# Patient Record
Sex: FEMALE | Race: WHITE | Employment: FULL TIME | URBAN - METROPOLITAN AREA
[De-identification: names, ages, dates, MRNs, and addresses within clinical notes are randomized per-mention and may not be internally consistent; named-entity substitution may affect disease eponyms.]

---

## 2019-02-15 ENCOUNTER — HOSPITAL ENCOUNTER (EMERGENCY)
Facility: CLINIC | Age: 54
Discharge: HOME | End: 2019-02-16
Attending: EMERGENCY MEDICINE | Admitting: EMERGENCY MEDICINE
Payer: COMMERCIAL

## 2019-02-15 DIAGNOSIS — K52.9 COLITIS: ICD-10-CM

## 2019-02-15 DIAGNOSIS — R10.32 LLQ ABDOMINAL PAIN: ICD-10-CM

## 2019-02-15 DIAGNOSIS — K57.30 DIVERTICULAR DISEASE OF COLON: ICD-10-CM

## 2019-02-15 DIAGNOSIS — R91.8 PULMONARY NODULES: ICD-10-CM

## 2019-02-15 PROCEDURE — 96361 HYDRATE IV INFUSION ADD-ON: CPT

## 2019-02-15 PROCEDURE — 99285 EMERGENCY DEPT VISIT HI MDM: CPT | Mod: 25

## 2019-02-15 PROCEDURE — 96360 HYDRATION IV INFUSION INIT: CPT

## 2019-02-15 RX ORDER — ONDANSETRON 2 MG/ML
4 INJECTION INTRAMUSCULAR; INTRAVENOUS ONCE
Status: DISCONTINUED | OUTPATIENT
Start: 2019-02-15 | End: 2019-02-16

## 2019-02-15 NOTE — ED AVS SNAPSHOT
[facility] Emergency Department  [address]  [facility] [address]  Phone:  [phone]  Fax:  [phone]                                    Connie Valdes   MRN: [id number]    Department:  [facility] Emergency Department   Date of Visit:  2/15/2019           After Visit Summary Signature Page    I have received my discharge instructions, and my questions have been answered. I have discussed any challenges I see with this plan with the nurse or doctor.    ..........................................................................................................................................  Patient/Patient Representative Signature      ..........................................................................................................................................  Patient Representative Print Name and Relationship to Patient    ..................................................               ................................................  Date                                   Time    ..........................................................................................................................................  Reviewed by Signature/Title    ...................................................              ..............................................  Date                                               Time          22EPIC Rev 08/18

## 2019-02-16 ENCOUNTER — APPOINTMENT (OUTPATIENT)
Dept: CT IMAGING | Facility: CLINIC | Age: 54
End: 2019-02-16
Attending: EMERGENCY MEDICINE
Payer: COMMERCIAL

## 2019-02-16 VITALS
SYSTOLIC BLOOD PRESSURE: 134 MMHG | WEIGHT: 233.69 LBS | OXYGEN SATURATION: 99 % | HEART RATE: 114 BPM | RESPIRATION RATE: 18 BRPM | TEMPERATURE: 98.2 F | DIASTOLIC BLOOD PRESSURE: 88 MMHG

## 2019-02-16 LAB
ALBUMIN SERPL-MCNC: 3.5 G/DL (ref 3.4–5)
ALBUMIN UR-MCNC: NEGATIVE MG/DL
ALP SERPL-CCNC: 112 U/L (ref 40–150)
ALT SERPL W P-5'-P-CCNC: 36 U/L (ref 0–50)
ANION GAP SERPL CALCULATED.3IONS-SCNC: 5 MMOL/L (ref 3–14)
APPEARANCE UR: ABNORMAL
AST SERPL W P-5'-P-CCNC: 27 U/L (ref 0–45)
BACTERIA #/AREA URNS HPF: ABNORMAL /HPF
BASOPHILS # BLD AUTO: 0.1 10E9/L (ref 0–0.2)
BASOPHILS NFR BLD AUTO: 0.3 %
BILIRUB SERPL-MCNC: 2 MG/DL (ref 0.2–1.3)
BILIRUB UR QL STRIP: NEGATIVE
BUN SERPL-MCNC: 11 MG/DL (ref 7–30)
CALCIUM SERPL-MCNC: 9.2 MG/DL (ref 8.5–10.1)
CHLORIDE SERPL-SCNC: 102 MMOL/L (ref 94–109)
CO2 SERPL-SCNC: 27 MMOL/L (ref 20–32)
COLOR UR AUTO: YELLOW
CREAT SERPL-MCNC: 0.86 MG/DL (ref 0.52–1.04)
DIFFERENTIAL METHOD BLD: ABNORMAL
EOSINOPHIL # BLD AUTO: 0 10E9/L (ref 0–0.7)
EOSINOPHIL NFR BLD AUTO: 0.3 %
ERYTHROCYTE [DISTWIDTH] IN BLOOD BY AUTOMATED COUNT: 13 % (ref 10–15)
GFR SERPL CREATININE-BSD FRML MDRD: 76 ML/MIN/{1.73_M2}
GLUCOSE SERPL-MCNC: 162 MG/DL (ref 70–99)
GLUCOSE UR STRIP-MCNC: NEGATIVE MG/DL
HCG UR QL: NEGATIVE
HCT VFR BLD AUTO: 45.2 % (ref 35–47)
HGB BLD-MCNC: 15.3 G/DL (ref 11.7–15.7)
HGB UR QL STRIP: ABNORMAL
IMM GRANULOCYTES # BLD: 0.1 10E9/L (ref 0–0.4)
IMM GRANULOCYTES NFR BLD: 0.3 %
KETONES UR STRIP-MCNC: NEGATIVE MG/DL
LEUKOCYTE ESTERASE UR QL STRIP: ABNORMAL
LIPASE SERPL-CCNC: 102 U/L (ref 73–393)
LYMPHOCYTES # BLD AUTO: 0.9 10E9/L (ref 0.8–5.3)
LYMPHOCYTES NFR BLD AUTO: 5.8 %
MCH RBC QN AUTO: 32.5 PG (ref 26.5–33)
MCHC RBC AUTO-ENTMCNC: 33.8 G/DL (ref 31.5–36.5)
MCV RBC AUTO: 96 FL (ref 78–100)
MONOCYTES # BLD AUTO: 1.7 10E9/L (ref 0–1.3)
MONOCYTES NFR BLD AUTO: 11 %
MUCOUS THREADS #/AREA URNS LPF: PRESENT /LPF
NEUTROPHILS # BLD AUTO: 12.9 10E9/L (ref 1.6–8.3)
NEUTROPHILS NFR BLD AUTO: 82.3 %
NITRATE UR QL: NEGATIVE
NRBC # BLD AUTO: 0 10*3/UL
NRBC BLD AUTO-RTO: 0 /100
PH UR STRIP: 5 PH (ref 5–7)
PLATELET # BLD AUTO: 226 10E9/L (ref 150–450)
POTASSIUM SERPL-SCNC: 4.1 MMOL/L (ref 3.4–5.3)
PROT SERPL-MCNC: 8 G/DL (ref 6.8–8.8)
RBC # BLD AUTO: 4.71 10E12/L (ref 3.8–5.2)
RBC #/AREA URNS AUTO: 3 /HPF (ref 0–2)
SODIUM SERPL-SCNC: 134 MMOL/L (ref 133–144)
SOURCE: ABNORMAL
SP GR UR STRIP: 1.02 (ref 1–1.03)
SQUAMOUS #/AREA URNS AUTO: 2 /HPF (ref 0–1)
UROBILINOGEN UR STRIP-MCNC: 0 MG/DL (ref 0–2)
WBC # BLD AUTO: 15.6 10E9/L (ref 4–11)
WBC #/AREA URNS AUTO: 5 /HPF (ref 0–5)

## 2019-02-16 PROCEDURE — 25000128 H RX IP 250 OP 636: Performed by: EMERGENCY MEDICINE

## 2019-02-16 PROCEDURE — 85025 COMPLETE CBC W/AUTO DIFF WBC: CPT | Performed by: EMERGENCY MEDICINE

## 2019-02-16 PROCEDURE — 83690 ASSAY OF LIPASE: CPT | Performed by: EMERGENCY MEDICINE

## 2019-02-16 PROCEDURE — 81025 URINE PREGNANCY TEST: CPT | Performed by: EMERGENCY MEDICINE

## 2019-02-16 PROCEDURE — 74177 CT ABD & PELVIS W/CONTRAST: CPT

## 2019-02-16 PROCEDURE — 80053 COMPREHEN METABOLIC PANEL: CPT | Performed by: EMERGENCY MEDICINE

## 2019-02-16 PROCEDURE — 81001 URINALYSIS AUTO W/SCOPE: CPT | Performed by: EMERGENCY MEDICINE

## 2019-02-16 RX ORDER — ONDANSETRON 4 MG/1
4 TABLET, ORALLY DISINTEGRATING ORAL EVERY 8 HOURS PRN
Qty: 10 TABLET | Refills: 0 | Status: SHIPPED | OUTPATIENT
Start: 2019-02-16 | End: 2019-03-18

## 2019-02-16 RX ORDER — METRONIDAZOLE 500 MG/1
500 TABLET ORAL 3 TIMES DAILY
Qty: 21 TABLET | Refills: 0 | Status: SHIPPED | OUTPATIENT
Start: 2019-02-16 | End: 2019-02-23

## 2019-02-16 RX ORDER — CIPROFLOXACIN 500 MG/1
500 TABLET, FILM COATED ORAL 2 TIMES DAILY
Qty: 14 TABLET | Refills: 0 | Status: SHIPPED | OUTPATIENT
Start: 2019-02-16 | End: 2019-02-23

## 2019-02-16 RX ORDER — IOPAMIDOL 755 MG/ML
500 INJECTION, SOLUTION INTRAVASCULAR ONCE
Status: COMPLETED | OUTPATIENT
Start: 2019-02-16 | End: 2019-02-16

## 2019-02-16 RX ADMIN — SODIUM CHLORIDE 65 ML: 9 INJECTION, SOLUTION INTRAVENOUS at 01:59

## 2019-02-16 RX ADMIN — IOPAMIDOL 100 ML: 755 INJECTION, SOLUTION INTRAVENOUS at 01:59

## 2019-02-16 RX ADMIN — SODIUM CHLORIDE 1000 ML: 9 INJECTION, SOLUTION INTRAVENOUS at 00:23

## 2019-02-16 ASSESSMENT — ENCOUNTER SYMPTOMS
FREQUENCY: 0
DIARRHEA: 1
DYSURIA: 0
SHORTNESS OF BREATH: 0
ABDOMINAL PAIN: 1
BLOOD IN STOOL: 0

## 2019-02-16 NOTE — ED TRIAGE NOTES
Pt with LLQ abd and associated diarrhea.  Pt states has had pain like this in past w/diarrhea but usually resolved after stooling.  Pain constant this time.  Denies n/v.

## 2019-02-16 NOTE — ED PROVIDER NOTES
History     Chief Complaint:  Abdominal Pain    HPI   Connie Valdes is a 53 year old female who presents with abdominal pain. The patient notes she has had left sided abdominal pain beginning at 1115 this morning. The patient notes she may have a low grade fever. The patient notes some diarrhea, but denies blood in her stools, dysuria, increased frequency of urination, nausea, or vomiting. The patient denies chest pain or shortness of breath. The patient denies any abdominal surgeries and notes her past colonoscopies have gita normal. The patient has taken extra strength tylenol which temporarily relieves the pain.     Allergies:  Latex  Sulfa drugs     Medications:    Gabapentin    Past Medical History:    Pinched nerve in L4-L5  Hot flashes  Diabetes    Past Surgical History:    The patient does not have any pertinent past surgical history.    Family History:    No past pertinent family history.    Social History:  Marital Status:   Single    Review of Systems   Respiratory: Negative for shortness of breath.    Cardiovascular: Negative for chest pain.   Gastrointestinal: Positive for abdominal pain and diarrhea. Negative for blood in stool.   Genitourinary: Negative for dysuria and frequency.   All other systems reviewed and are negative.    Physical Exam     Patient Vitals for the past 24 hrs:   BP Temp Temp src Pulse Resp SpO2 Weight   02/16/19 0245 -- -- -- -- -- 99 % --   02/16/19 0230 -- -- -- -- -- 90 % --   02/16/19 0228 -- -- -- -- -- 98 % --   02/16/19 0030 -- -- -- -- -- 97 % --   02/15/19 2329 134/88 98.2  F (36.8  C) Oral 114 18 98 % 106 kg (233 lb 11 oz)     Physical Exam  General: Alert, appears well-developed and well-nourished. Cooperative.     In mild distress  HEENT:  Head:  Atraumatic  Ears:  External ears are normal  Mouth/Throat:  Oropharynx is without erythema or exudate and mucous membranes are moist   Eyes:   Conjunctivae normal and EOM are normal. No scleral icterus.  CV:  Tachycardic,  regular rhythm, normal heart sounds and radial pulses are 2+ and symmetric.  No murmur.  Resp:  Breath sounds are clear bilaterally    Non-labored, no retractions or accessory muscle use  GI:  Abdomen is soft, no distension, left lower quadrant tenderness. No rebound or guarding.  No CVA tenderness bilaterally  MS:  Normal range of motion. No edema.    Normal strength in all 4 extremities.     Back atraumatic.    No midline cervical, thoracic, or lumbar tenderness  Skin:  Warm and dry.  No rash or lesions noted.  Neuro:   Alert. Normal strength. GCS: 15  Psych: Normal mood and affect.  Lymph:    Emergency Department Course     Imaging:  Radiology findings were communicated with the patient who voiced understanding of the findings.    CT Abdomen Pelvis w Contrast  1. There is a long segment of nonspecific colitis extending from  distal transverse colon to mid sigmoid colon.  2. Colonic diverticula without acute diverticulitis.  3. A few small indeterminant lung base nodules. Follow-up recommended.  4. Fatty infiltration of the liver.  5. Uterine fibroids.    Recommendations for one or multiple incidental lung nodules < 6mm :    Low risk patients: No routine follow-up.    High risk patients: Optional follow-up CT at twelve months; if  unchanged, no further follow-up.    *Low Risk: Minimal or absent history of smoking or other known risk  factors.  *Nonsolid (ground-glass) or partly solid nodules may require longer  follow-up to exclude indolent adenocarcinoma.  *Recommendations based on Guidelines for the Management of Incidental  Pulmonary Nodules Detected at CT: From the Fleischner Society 2017,  Radiology 2017.  Reading per radiology    Laboratory:  Laboratory findings were communicated with the patient who voiced understanding of the findings.    UA with Microscopic: blood small (A), leukocyte esterase small (A), RBC 3 (H), bacteria few (A), squamous epithelial 2 (H), mucous present (A) o/w WNL  HCG qualitative  urine: negative     CBC: WBC 15.6(H), HGB 15.3,   CMP: glucose 162(H), bilirubin 2.0(H) o/w WNL (Creatinine 0.86)  Lipase: 102    Interventions:    2354 Zofran 4 mg IV  0023 NS 1000 mL IV  0159 NS 65 mL IV  0159 Iopamidol 100 mL IV    Emergency Department Course:    0007 Nursing notes and vitals reviewed.    0010 I performed an exam of the patient as documented above.     0022 IV was inserted and blood was drawn for laboratory testing, results above.    0023 The patient provided a urine sample here in the emergency department. This was sent for laboratory testing, findings above.    0158 The patient was sent for a CT Abdomen Pelvis while in the emergency department, results above.     0231 I returned to update the patient.     0251 I personally reviewed the laboratory and imaging results with the patient and answered all related questions prior to discharge.    Impression & Plan      Medical Decision Making:  Patient is a 53-year-old female who presents with left lower quadrant abdominal pain.  Patient with an elevated white blood cell count and tachycardic on arrival.  Reassuringly, the patient had a CT scan confirming no evidence of acute diverticulitis,  but there is significant amount of colonic inflammation suspicious for colitis throughout the sigmoid and transverse colon.  Patient has never had colitis symptoms in the past, and due to the significant abdominal discomfort as well as concerning presentation I will plan to treat the patient with a course of outpatient antibiotics.  Patient does appear to have colonic diverticula, but no evidence of acute diverticulitis on the CT scan although the widespread evidence of colitis makes me slightly concerned for potential worsening in the next few days.  There were some indeterminate lung base nodules noted on the CT scan.  Due to these incidental findings patient will be referred to the pulmonary nodule clinic.  Return precautions for development of fever,  vomiting, or inability to tolerate oral intake were discussed at time of discharge.  Will f/u with PCP or gastroenterologist in next 1 week for recheck.  All questions answered for discharge.  Discharged home.    Diagnosis:    ICD-10-CM    1. Colitis K52.9    2. LLQ abdominal pain R10.32    3. Diverticular disease of colon K57.30       Disposition:   The patient is discharged to home.    Discharge Medications:     START taking      Dose / Directions   ciprofloxacin 500 MG tablet  Commonly known as:  CIPRO      Dose:  500 mg  Take 1 tablet (500 mg) by mouth 2 times daily for 7 days  Quantity:  14 tablet  Refills:  0     metroNIDAZOLE 500 MG tablet  Commonly known as:  FLAGYL      Dose:  500 mg  Take 1 tablet (500 mg) by mouth 3 times daily for 7 days  Quantity:  21 tablet  Refills:  0     ondansetron 4 MG ODT tab  Commonly known as:  ZOFRAN ODT      Dose:  4 mg  Take 1 tablet (4 mg) by mouth every 8 hours as needed for nausea  Quantity:  10 tablet  Refills:  0           Where to get your medicines      Some of these will need a paper prescription and others can be bought over the counter. Ask your nurse if you have questions.    Bring a paper prescription for each of these medications    ciprofloxacin 500 MG tablet    metroNIDAZOLE 500 MG tablet    ondansetron 4 MG ODT tab         Scribe Disclosure:  I, Lissett Washington, am serving as a scribe at 12:14 AM on 2/16/2019 to document services personally performed by Chilo Escobedo MD based on my observations and the provider's statements to me.  [facility] EMERGENCY DEPARTMENT       Chilo Escobedo MD  02/16/19 0423

## 2019-03-01 ENCOUNTER — TELEPHONE (OUTPATIENT)
Dept: ONCOLOGY | Facility: CLINIC | Age: 54
End: 2019-03-01

## 2019-03-01 NOTE — TELEPHONE ENCOUNTER
ONCOLOGY INTAKE: Records Information      APPT INFORMATION:  Referring provider:  Chilo Escobedo  Referring provider s clinic:    Reason for visit/diagnosis:  Lung Nodule    Were the records received with the referral (via [employer])? No, internal referral    Has patient been seen for any external appt for this diagnosis (enter clinic/location)? No    ADDITIONAL INFORMATION:

## 2019-03-21 NOTE — TELEPHONE ENCOUNTER
RECORDS STATUS - ALL OTHER DIAGNOSIS      RECORDS RECEIVED FROM: [facility]   DATE RECEIVED: 3/21/2019     NOTES STATUS DETAILS   OFFICE NOTE from referring provider Complete [facility] Dr. Escobedo   OFFICE NOTE from medical oncologist NA    DISCHARGE SUMMARY from hospital NA    DISCHARGE REPORT from the ER Complete [facility] 2/15/19   OPERATIVE REPORT NA    MEDICATION LIST Complete [facility]   CLINICAL TRIAL TREATMENTS TO DATE NA    LABS     PATHOLOGY REPORTS NA    ANYTHING RELATED TO DIAGNOSIS Complete Epic   GENONOMIC TESTING     TYPE: NA    IMAGING (NEED IMAGES & REPORT)     CT SCANS Complete PACS   MRI NA    MAMMO NA    ULTRASOUND NA    PET NA

## 2019-03-25 ENCOUNTER — PRE VISIT (OUTPATIENT)
Dept: ONCOLOGY | Facility: CLINIC | Age: 54
End: 2019-03-25

## 2019-03-25 ENCOUNTER — HOSPITAL ENCOUNTER (OUTPATIENT)
Facility: CLINIC | Age: 54
Setting detail: SPECIMEN
End: 2019-03-25
Attending: EMERGENCY MEDICINE
Payer: COMMERCIAL

## 2019-04-24 NOTE — TELEPHONE ENCOUNTER
ONCOLOGY INTAKE: Records Information      APPT INFORMATION:  Referring provider:  Chilo Escobedo MD  Referring provider s clinic:   Emergency Dept  Reason for visit/diagnosis:  Pulmonary Nodules    RECORDS INFORMATION:  Were the records received with the referral (via [employer])? No - Internal referral    Has patient been seen for any external appt for this diagnosis? Per PT, no outside records    ADDITIONAL INFORMATION:  CT on 2/15/19

## 2019-04-26 NOTE — TELEPHONE ENCOUNTER
RECORDS STATUS - ALL OTHER DIAGNOSIS      RECORDS RECEIVED FROM: [facility]   DATE RECEIVED: 4/26/19   NOTES STATUS DETAILS   OFFICE NOTE from referring provider 2/15/19 [facility]   OFFICE NOTE from medical oncologist  CE   DISCHARGE SUMMARY from hospital  CE   DISCHARGE REPORT from the ER  [facility]   OPERATIVE REPORT NA    MEDICATION LIST  [facility]   CLINICAL TRIAL TREATMENTS TO DATE     LABS     PATHOLOGY REPORTS NA    ANYTHING RELATED TO DIAGNOSIS  Epic   GENONOMIC TESTING     TYPE:     IMAGING (NEED IMAGES & REPORT)     CT SCANS 2/15/19 PACS   MRI     MAMMO     ULTRASOUND     PET

## 2019-05-01 ENCOUNTER — DOCUMENTATION ONLY (OUTPATIENT)
Dept: CARE COORDINATION | Facility: CLINIC | Age: 54
End: 2019-05-01

## 2019-05-28 ENCOUNTER — TELEPHONE (OUTPATIENT)
Dept: PULMONOLOGY | Facility: CLINIC | Age: 54
End: 2019-05-28

## 2019-05-28 DIAGNOSIS — R91.8 PULMONARY NODULES: Primary | ICD-10-CM

## 2019-05-28 NOTE — TELEPHONE ENCOUNTER
I called patient to schedule the CT chest needed prior to appt with Dr Shelton on 6/4/19. Pt was not available, I left appt details (time, date, location) of CT on voicemail & asked for a return call to confirm. CT is scheduled as a walk-in to be done prior to appt with Dr Shelton at 1040. Pt is to arrive at 940am per CT staff.

## 2019-06-04 ENCOUNTER — PRE VISIT (OUTPATIENT)
Dept: PULMONOLOGY | Facility: CLINIC | Age: 54
End: 2019-06-04

## 2019-07-02 ENCOUNTER — PRE VISIT (OUTPATIENT)
Dept: PULMONOLOGY | Facility: CLINIC | Age: 54
End: 2019-07-02

## 2019-07-02 ENCOUNTER — OFFICE VISIT (OUTPATIENT)
Dept: PULMONOLOGY | Facility: CLINIC | Age: 54
End: 2019-07-02
Attending: EMERGENCY MEDICINE
Payer: COMMERCIAL

## 2019-07-02 ENCOUNTER — ANCILLARY PROCEDURE (OUTPATIENT)
Dept: CT IMAGING | Facility: CLINIC | Age: 54
End: 2019-07-02
Attending: CLINICAL NURSE SPECIALIST
Payer: COMMERCIAL

## 2019-07-02 VITALS
HEIGHT: 69 IN | HEART RATE: 71 BPM | DIASTOLIC BLOOD PRESSURE: 75 MMHG | WEIGHT: 238.3 LBS | BODY MASS INDEX: 35.29 KG/M2 | TEMPERATURE: 98.6 F | OXYGEN SATURATION: 97 % | SYSTOLIC BLOOD PRESSURE: 117 MMHG

## 2019-07-02 DIAGNOSIS — R91.8 PULMONARY NODULES: ICD-10-CM

## 2019-07-02 DIAGNOSIS — R91.8 PULMONARY NODULES: Primary | ICD-10-CM

## 2019-07-02 LAB — RADIOLOGIST FLAGS: NORMAL

## 2019-07-02 PROCEDURE — G0463 HOSPITAL OUTPT CLINIC VISIT: HCPCS | Mod: ZF

## 2019-07-02 RX ORDER — GABAPENTIN 100 MG/1
100 CAPSULE ORAL
COMMUNITY

## 2019-07-02 RX ORDER — PRAVASTATIN SODIUM 40 MG
40 TABLET ORAL
COMMUNITY
Start: 2016-04-04

## 2019-07-02 RX ORDER — TRETINOIN 0.5 MG/G
CREAM TOPICAL
COMMUNITY
Start: 2018-05-01

## 2019-07-02 RX ORDER — LISINOPRIL 20 MG/1
20 TABLET ORAL
COMMUNITY
Start: 2019-01-08

## 2019-07-02 RX ORDER — LEVOTHYROXINE SODIUM 75 UG/1
75 TABLET ORAL
COMMUNITY
Start: 2019-01-08

## 2019-07-02 RX ORDER — AMMONIUM LACTATE 12 G/100G
LOTION TOPICAL
COMMUNITY
Start: 2017-12-12

## 2019-07-02 RX ORDER — FEXOFENADINE HCL 60 MG/1
60 TABLET, FILM COATED ORAL
COMMUNITY
Start: 2019-05-05

## 2019-07-02 RX ORDER — FLUTICASONE PROPIONATE 50 MCG
1-2 SPRAY, SUSPENSION (ML) NASAL
COMMUNITY
Start: 2018-05-01

## 2019-07-02 RX ORDER — MODAFINIL 200 MG/1
200 TABLET ORAL
COMMUNITY
Start: 2019-04-02

## 2019-07-02 RX ORDER — ACETAMINOPHEN 500 MG
500-1000 TABLET ORAL
COMMUNITY
Start: 2019-04-03 | End: 2020-08-06

## 2019-07-02 ASSESSMENT — PAIN SCALES - GENERAL: PAINLEVEL: NO PAIN (0)

## 2019-07-02 ASSESSMENT — MIFFLIN-ST. JEOR: SCORE: 1735.3

## 2019-07-02 NOTE — LETTER
7/2/2019       RE: Connie Valdes  [address]  [facility] [address]     Dear Colleague,    Thank you for referring your patient, Connie Valdes, to the [facility] at [facility]. Please see a copy of my visit note below.    LUNG NODULE & INTERVENTIONAL PULMONARY CLINIC  CLINICS & SURGERY [facility]     Connie Valdes MRN# [id number]   Age: 54 year old YOB: 1965     Reason for Consultation: lung nodule(s)    Requesting Physician: Chilo Escobeod MD  [facility]  [address] [place] [address]       Assessment and Plan:    1. New multiple pulmonary lung nodule(s). Given the characteristics on current/previous imaging and risk factors; I would classify this to be Low (<6%) risk for cancer. Has multiple/numerous nodules sub5mm bilaterally. Given h/o breast cancer we can repeat in 12mo to confirm stability.     Billing: The patient was seen and examined by me and the findings, assessment, and plan as documented was explained to the patient/family who expressed understand.     Lucio Shelton MD   of Medicine  Interventional Pulmonology  Department of Pulmonary, Allergy, Critical Care and Sleep Medicine   [facility]  Pager: [phone]          History:     Connie Valdes is a 54 year old female with sig h/o for breast cancer, HTN, hyperlipidemia, DM2, NAHUN who is here for evaluation/followup of lung nodule(s).    - No new resp sx or complaints. Denies dyspnea or cough.   - Went to ED for back pain and had CT chest demonstrating nodules.   - Personal hx of cancer: breast cancer. Up-to-date on mammo and c-scope.   - Family hx of cancer: no lung cancer   - Exposure hx: Denies asbestos or radon exposure   - Tobacco hx: Never smoker   - My interpretation of the images relevant for this visit includes: nodules   - My interpretation  of the PFT's relevant for this visit includes: None    Culprit Nodule(s):   Multiple bilateral lung nodule(s) that are sub 5 mm. First seen by chest CT on 7/2/19. First observed on this date     Other active medical problems include:   - had breast cancer (left side, dx 2014) s/p lumpectomy + chemotherapy+radiation. Currently in remission.    - Has HTN, hyperlipidemia, and DM2. Stable     - Has NAHUN on CPAP.         Past Medical History:   Breast cancer, HTN, DM2, hyperlipidemia         Past Surgical History:    No past surgical history on file.          Social History:     Social History     Tobacco Use     Smoking status: Never Smoker     Smokeless tobacco: Never Used   Substance Use Topics     Alcohol use: Not on file          Family History:   No family history on file.        Allergies:      Allergies   Allergen Reactions     Ciprofloxacin Rash     Latex      Sulfa Drugs      Liquid Adhesive Rash     Sulfamethoxazole-Trimethoprim Hives and Rash          Medications:     Current Outpatient Medications   Medication Sig     acetaminophen (TYLENOL) 500 MG tablet Take 500-1,000 mg by mouth     ammonium lactate (LAC-HYDRIN) 12 % external lotion      aspirin (ASA) 81 MG EC tablet Take 81 mg by mouth     fexofenadine (ALLEGRA) 60 MG tablet Take 60 mg by mouth     fluticasone (FLONASE) 50 MCG/ACT nasal spray Spray 1-2 sprays in nostril     levothyroxine (SYNTHROID/LEVOTHROID) 75 MCG tablet Take 75 mcg by mouth     lisinopril (PRINIVIL/ZESTRIL) 20 MG tablet Take 20 mg by mouth     modafinil (PROVIGIL) 200 MG tablet Take 200 mg by mouth     pravastatin (PRAVACHOL) 40 MG tablet Take 40 mg by mouth     tretinoin (RETIN-A) 0.05 % external cream      gabapentin (NEURONTIN) 100 MG capsule Take 100 mg by mouth     No current facility-administered medications for this visit.           Review of Systems:     CONSTITUTIONAL: negative for fever, chills, change in weight  INTEGUMENTARY/SKIN: no rash or obvious new lesions  ENT/MOUTH:  no sore throat, new sinus pain or nasal drainage  RESP: see interval history  CV: negative for chest pain, palpitations or peripheral edema  GI: no nausea, vomiting, change in stools  : no dysuria  MUSCULOSKELETAL: no myalgias, arthralgias  ENDOCRINE: blood sugars with adequate control  PSYCHIATRIC: mood stable  LYMPHATIC: no new lymphadenopathy  HEME: no bleeding or easy bruisability  NEURO: no numbness, weakness, headaches         Physical Exam:     Temp:  [98.6  F (37  C)] 98.6  F (37  C)  Pulse:  [71] 71  BP: (117)/(75) 117/75  SpO2:  [97 %] 97 %  Wt Readings from Last 4 Encounters:   07/02/19 108.1 kg (238 lb 4.8 oz)   02/15/19 106 kg (233 lb 11 oz)     Constitutional:   Awake, alert and in no apparent distress     Eyes:   Nonicteric, SENG     ENT:    Trachea is midline. No gross neck abnormalities      Neck:   Supple without supraclavicular or cervical lymphadenopathy     Lungs:   Good air flow.  No crackles. No rhonchi.  No wheezes.     Cardiovascular:   Normal S1 and S2.  RRR.  No murmur, gallop or rub.  Radial, DP and PT pulses normal and symmetric     Abdomen:   NABS, soft, nontender, nondistended.  No HSM.     Musculoskeletal:   No edema.      Neurologic:   Alert and conversant. Cranial nerves  intact.       Skin:   Warm, dry.  No rash on limited exam.           Current Laboratory Data:   All laboratory and imaging data reviewed.    Results for orders placed or performed in visit on 07/02/19 (from the past 24 hour(s))   CT Chest w/o contrast   Result Value Ref Range    Radiologist flags Lung nodule     Narrative    EXAMINATION: Chest CT  7/2/2019 10:18 AM    CLINICAL HISTORY: Pulmonary nodules; per CareEverywhere, patient has  history of breast cancer status post lumpectomy in 2014 a    COMPARISON: CT abdomen and pelvis 2/16/2019.    TECHNIQUE: CT imaging obtained through the chest without contrast.  Axial, coronal, and sagittal reconstructions and axial MIP reformatted  images are reviewed.      FINDINGS:  Lungs: There are innumerable scattered micronodules throughout the  lungs. The largest of these in the left lung measures 4 mm (series 4  image 165) and the largest in the right lung measures 5 mm (series 4  image 202). These largest nodules are not substantially increased in  size compared to 2/16/2019, however the overall number of micronodules  is markedly increased since that time. No pleural effusion or  pneumothorax. No consolidative airspace opacities. The central  tracheobronchial tree is patent.    Mediastinum: The thyroid gland is enlarged with hypoattenuating  nodules in the inferior left thyroid lobe heart size is normal. No  pericardial effusion.  Normal thoracic vasculature. No thoracic  lymphadenopathy.     Bones and soft tissues: Degenerative changes of the spine. No  suspicious bone findings. Skin thickening of the left breast with  hypoattenuating 1.6 x 0.8 cm structure in the left breast.    Upper Abdomen: Limited evaluation. Cholelithiasis without acute  cholecystitis. Diverticulosis without acute diverticulitis. No acute  pathology.      Impression    IMPRESSION:   1. Innumerable scattered micronodules with a few prominent nodules  measuring up to 5 mm. The largest nodules are not substantially  increased in size since 2/16/2019, however the overall number of  nodules is increased since that time. Differential includes  infectious/inflammatory etiologies such as sarcoidosis,  hypersensitivity pneumonitis, and nodular pulmonary metastases. Close  follow up in 3 months and/or PETCT in further evaluation is  reocmmended.  2. Enlarged thyroid gland with hypoattenuating nodules in the inferior  left thyroid lobe. Further evaluation with ultrasound  is recommended.  3. Skin thickening of the left breast with hypoattenuating lesion,  possibly representing cyst versus seroma in the setting of prior  lobectomy.    [Recommend Follow Up: Lung nodule]    This report will be copied to the  [facility] to ensure a  provider acknowledges the finding.     I have personally reviewed the examination and initial interpretation  and I agree with the findings.    PABLO AMBRIZ MD              Previous Cardiology Imaging   No results found for this or any previous visit (from the past 8760 hour(s)).               Again, thank you for allowing me to participate in the care of your patient.      Sincerely,    Lucio Shelton MD

## 2019-07-02 NOTE — NURSING NOTE
"Oncology Rooming Note    July 2, 2019 10:43 AM   Connie Valdes is a 54 year old female who presents for:    Chief Complaint   Patient presents with     Consult     New- PULMONARY NODULES     Initial Vitals: /75   Pulse 71   Temp 98.6  F (37  C) (Oral)   Ht 1.753 m (5' 9\")   Wt 108.1 kg (238 lb 4.8 oz)   SpO2 97%   BMI 35.19 kg/m   Estimated body mass index is 35.19 kg/m  as calculated from the following:    Height as of this encounter: 1.753 m (5' 9\").    Weight as of this encounter: 108.1 kg (238 lb 4.8 oz). Body surface area is 2.29 meters squared.  No Pain (0) Comment: Data Unavailable   No LMP recorded. Patient is postmenopausal.  Allergies reviewed: Yes  Medications reviewed: Yes    Medications: Medication refills not needed today.  Pharmacy name entered into EPIC: Data Unavailable    Clinical concerns: n/a       AJCKIE YAP CMA              "

## 2019-07-02 NOTE — PROGRESS NOTES
LUNG NODULE & INTERVENTIONAL PULMONARY CLINIC  [facility], [facility]     Connie Valdes MRN# [id number]   Age: 54 year old YOB: 1965     Reason for Consultation: lung nodule(s)    Requesting Physician: Chilo Escobedo MD  [facility]  [address] [place] [address]       Assessment and Plan:    1. New multiple pulmonary lung nodule(s). Given the characteristics on current/previous imaging and risk factors; I would classify this to be Low (<6%) risk for cancer. Has multiple/numerous nodules sub5mm bilaterally. Given h/o breast cancer we can repeat in 12mo to confirm stability.     Billing: The patient was seen and examined by me and the findings, assessment, and plan as documented was explained to the patient/family who expressed understand.     Lucio Shelton MD   of Medicine  Interventional Pulmonology  Department of Pulmonary, Allergy, Critical Care and Sleep Medicine   [facility]  Pager: [phone]          History:     Connie Valdes is a 54 year old female with sig h/o for breast cancer, HTN, hyperlipidemia, DM2, NAHUN who is here for evaluation/followup of lung nodule(s).    - No new resp sx or complaints. Denies dyspnea or cough.   - Went to ED for back pain and had CT chest demonstrating nodules.   - Personal hx of cancer: breast cancer. Up-to-date on mammo and c-scope.   - Family hx of cancer: no lung cancer   - Exposure hx: Denies asbestos or radon exposure   - Tobacco hx: Never smoker   - My interpretation of the images relevant for this visit includes: nodules   - My interpretation of the PFT's relevant for this visit includes: None    Culprit Nodule(s):   Multiple bilateral lung nodule(s) that are sub 5 mm. First seen by chest CT on 7/2/19. First observed on this date     Other active medical problems include:   - had breast cancer (left side, dx 2014) s/p lumpectomy +  chemotherapy+radiation. Currently in remission.    - Has HTN, hyperlipidemia, and DM2. Stable     - Has NAHUN on CPAP.         Past Medical History:   Breast cancer, HTN, DM2, hyperlipidemia         Past Surgical History:    No past surgical history on file.          Social History:     Social History     Tobacco Use     Smoking status: Never Smoker     Smokeless tobacco: Never Used   Substance Use Topics     Alcohol use: Not on file          Family History:   No family history on file.        Allergies:      Allergies   Allergen Reactions     Ciprofloxacin Rash     Latex      Sulfa Drugs      Liquid Adhesive Rash     Sulfamethoxazole-Trimethoprim Hives and Rash          Medications:     Current Outpatient Medications   Medication Sig     acetaminophen (TYLENOL) 500 MG tablet Take 500-1,000 mg by mouth     ammonium lactate (LAC-HYDRIN) 12 % external lotion      aspirin (ASA) 81 MG EC tablet Take 81 mg by mouth     fexofenadine (ALLEGRA) 60 MG tablet Take 60 mg by mouth     fluticasone (FLONASE) 50 MCG/ACT nasal spray Spray 1-2 sprays in nostril     levothyroxine (SYNTHROID/LEVOTHROID) 75 MCG tablet Take 75 mcg by mouth     lisinopril (PRINIVIL/ZESTRIL) 20 MG tablet Take 20 mg by mouth     modafinil (PROVIGIL) 200 MG tablet Take 200 mg by mouth     pravastatin (PRAVACHOL) 40 MG tablet Take 40 mg by mouth     tretinoin (RETIN-A) 0.05 % external cream      gabapentin (NEURONTIN) 100 MG capsule Take 100 mg by mouth     No current facility-administered medications for this visit.           Review of Systems:     CONSTITUTIONAL: negative for fever, chills, change in weight  INTEGUMENTARY/SKIN: no rash or obvious new lesions  ENT/MOUTH: no sore throat, new sinus pain or nasal drainage  RESP: see interval history  CV: negative for chest pain, palpitations or peripheral edema  GI: no nausea, vomiting, change in stools  : no dysuria  MUSCULOSKELETAL: no myalgias, arthralgias  ENDOCRINE: blood sugars with adequate  control  PSYCHIATRIC: mood stable  LYMPHATIC: no new lymphadenopathy  HEME: no bleeding or easy bruisability  NEURO: no numbness, weakness, headaches         Physical Exam:     Temp:  [98.6  F (37  C)] 98.6  F (37  C)  Pulse:  [71] 71  BP: (117)/(75) 117/75  SpO2:  [97 %] 97 %  Wt Readings from Last 4 Encounters:   07/02/19 108.1 kg (238 lb 4.8 oz)   02/15/19 106 kg (233 lb 11 oz)     Constitutional:   Awake, alert and in no apparent distress     Eyes:   Nonicteric, SENG     ENT:    Trachea is midline. No gross neck abnormalities      Neck:   Supple without supraclavicular or cervical lymphadenopathy     Lungs:   Good air flow.  No crackles. No rhonchi.  No wheezes.     Cardiovascular:   Normal S1 and S2.  RRR.  No murmur, gallop or rub.  Radial, DP and PT pulses normal and symmetric     Abdomen:   NABS, soft, nontender, nondistended.  No HSM.     Musculoskeletal:   No edema.      Neurologic:   Alert and conversant. Cranial nerves  intact.       Skin:   Warm, dry.  No rash on limited exam.           Current Laboratory Data:   All laboratory and imaging data reviewed.    Results for orders placed or performed in visit on 07/02/19 (from the past 24 hour(s))   CT Chest w/o contrast   Result Value Ref Range    Radiologist flags Lung nodule     Narrative    EXAMINATION: Chest CT  7/2/2019 10:18 AM    CLINICAL HISTORY: Pulmonary nodules; per CareEverywhere, patient has  history of breast cancer status post lumpectomy in 2014 a    COMPARISON: CT abdomen and pelvis 2/16/2019.    TECHNIQUE: CT imaging obtained through the chest without contrast.  Axial, coronal, and sagittal reconstructions and axial MIP reformatted  images are reviewed.     FINDINGS:  Lungs: There are innumerable scattered micronodules throughout the  lungs. The largest of these in the left lung measures 4 mm (series 4  image 165) and the largest in the right lung measures 5 mm (series 4  image 202). These largest nodules are not substantially increased  in  size compared to 2/16/2019, however the overall number of micronodules  is markedly increased since that time. No pleural effusion or  pneumothorax. No consolidative airspace opacities. The central  tracheobronchial tree is patent.    Mediastinum: The thyroid gland is enlarged with hypoattenuating  nodules in the inferior left thyroid lobe heart size is normal. No  pericardial effusion.  Normal thoracic vasculature. No thoracic  lymphadenopathy.     Bones and soft tissues: Degenerative changes of the spine. No  suspicious bone findings. Skin thickening of the left breast with  hypoattenuating 1.6 x 0.8 cm structure in the left breast.    Upper Abdomen: Limited evaluation. Cholelithiasis without acute  cholecystitis. Diverticulosis without acute diverticulitis. No acute  pathology.      Impression    IMPRESSION:   1. Innumerable scattered micronodules with a few prominent nodules  measuring up to 5 mm. The largest nodules are not substantially  increased in size since 2/16/2019, however the overall number of  nodules is increased since that time. Differential includes  infectious/inflammatory etiologies such as sarcoidosis,  hypersensitivity pneumonitis, and nodular pulmonary metastases. Close  follow up in 3 months and/or PETCT in further evaluation is  reocmmended.  2. Enlarged thyroid gland with hypoattenuating nodules in the inferior  left thyroid lobe. Further evaluation with ultrasound  is recommended.  3. Skin thickening of the left breast with hypoattenuating lesion,  possibly representing cyst versus seroma in the setting of prior  lobectomy.    [Recommend Follow Up: Lung nodule]    This report will be copied to the [facility] to ensure a  provider acknowledges the finding.     I have personally reviewed the examination and initial interpretation  and I agree with the findings.    PABLO AMBRIZ MD              Previous Cardiology Imaging   No results found for this or any previous visit  (from the past 8760 hour(s)).

## 2019-09-27 ENCOUNTER — HEALTH MAINTENANCE LETTER (OUTPATIENT)
Age: 54
End: 2019-09-27

## 2020-07-06 ENCOUNTER — TELEPHONE (OUTPATIENT)
Dept: SURGERY | Facility: CLINIC | Age: 55
End: 2020-07-06

## 2020-07-06 NOTE — TELEPHONE ENCOUNTER
I tried to reach patient, left VM telling her that a chest CT is needed for ongoing lung nodule surveillance.   I explained that getting annual mammograms does NOT get any lung fields but only the breast tissue.   I said if she is reluctant or refusing to get a chest ct scan, her appointment with Dr. Shelton would be unnecessary.   I asked her to let us know how she wants to proceed.

## 2020-07-07 ENCOUNTER — ANCILLARY PROCEDURE (OUTPATIENT)
Dept: CT IMAGING | Facility: CLINIC | Age: 55
End: 2020-07-07
Attending: INTERNAL MEDICINE
Payer: COMMERCIAL

## 2020-07-07 DIAGNOSIS — R91.8 PULMONARY NODULES: ICD-10-CM

## 2020-07-28 ENCOUNTER — APPOINTMENT (OUTPATIENT)
Dept: PULMONOLOGY | Facility: CLINIC | Age: 55
End: 2020-07-28
Attending: INTERNAL MEDICINE
Payer: COMMERCIAL

## 2020-07-28 NOTE — PROGRESS NOTES
"Connie Valdes is a 55 year old female who is being evaluated via a billable video visit.      The patient has been notified of following:     \"This video visit will be conducted via a call between you and your physician/provider. We have found that certain health care needs can be provided without the need for an in-person physical exam.  This service lets us provide the care you need with a video conversation.  If a prescription is necessary we can send it directly to your pharmacy.  If lab work is needed we can place an order for that and you can then stop by our lab to have the test done at a later time.    Video visits are billed at different rates depending on your insurance coverage.  Please reach out to your insurance provider with any questions.    If during the course of the call the physician/provider feels a video visit is not appropriate, you will not be charged for this service.\"    Patient has given verbal consent for Video visit? Yes - Per MD    {If patient encounters technical issues they should call [phone] :[phone]}      Video-Visit Details    Type of service:  Video Visit    Video Start Time: {video visit start/end time:152948}  Video End Time: {video visit start/end time:152948}    Originating Location (pt. Location): {video visit patient location:580676::\"Home\"}    Distant Location (provider location):  [facility]     Platform used for Video Visit: {Virtual Visit Platforms:436962::\"Gu[employer] SongsWell\"}    {signature options:996182}          LUNG NODULE & INTERVENTIONAL PULMONARY CLINIC  CLINICS & SURGERY [place], [facility], [facility]   VIRTUAL TELEPHONE VISIT    Connie Valdes MRN# [id number]   Age: 55 year old YOB: 1965     Reason for Consultation: lung nodule(s)    Requesting Physician: Chilo Escobedo MD  [facility]  [address] [facility] [address] [address]    Connie Valdes is a 55 year old female who is " "being evaluated via a billable telephone visit.      The patient has been notified of following: \"This telephone visit will be conducted via a call between you and your physician/provider. We have found that certain health care needs can be provided without the need for a physical exam.  This service lets us provide the care you need with a short phone conversation.  If a prescription is necessary we can send it directly to your pharmacy.  If lab work is needed we can place an order for that and you can then stop by our lab to have the test done at a later time. Telephone visits are billed at different rates depending on your insurance coverage. During this emergency period, for some insurers they may be billed the same as an in-person visit.  Please reach out to your insurance provider with any questions. If during the course of the call the physician/provider feels a telephone visit is not appropriate, you will not be charged for this service.\"    Patient has given verbal consent for Telephone visit?  Yes    How would you like to obtain your AVS? [employer]    Phone call duration: 25 minutes    Additional provider notes: see below     Assessment and Plan:    1. New multiple pulmonary lung nodule(s). Slightly increase in reticulonodular infiltrates however she is asymptomatic. Plan repeat CT in 3mo. If worsening, will discuss bronch/BAL.     2. Left breast cyst on CT. She already knew about this and says its related to previous breast cancer.     3. Left thyroid nodule. Planning to get biopsy through PCP.     Billing: I spent more than 30 minutes face to face and greater than 50% of time was for counseling and coordination of care about the issues above.      Lucio Shelton MD   of Medicine  Interventional Pulmonology  Department of Pulmonary, Allergy, Critical Care and Sleep Medicine   [facility], [facility]  Pager: [phone]          History:     Connie Valdes is a 54 year old " female with sig h/o for breast cancer, HTN, hyperlipidemia, DM2, NAHUN who is here for evaluation/followup of lung nodule(s).     - No new resp sx or complaints. Denies dyspnea or cough.   - Went to ED for back pain and had CT chest demonstrating nodules.   - Personal hx of cancer: breast cancer. Up-to-date on mammo and c-scope.   - Family hx of cancer: no lung cancer   - Exposure hx: Denies asbestos or radon exposure   - Tobacco hx: Never smoker   - My interpretation of the images relevant for this visit includes: nodules   - My interpretation of the PFT's relevant for this visit includes: None     Culprit Nodule(s):   Multiple bilateral lung nodule(s) that are sub 5 mm. First seen by chest CT on 7/2/19. First observed on this date      Other active medical problems include:   - had breast cancer (left side, dx 2014) s/p lumpectomy + chemotherapy+radiation. Currently in remission.    - Has HTN, hyperlipidemia, and DM2. Stable     - Has NAHUN on CPAP.            Past Medical History:   Breast cancer, HTN, DM2, hyperlipidemia         Past Surgical History:    No past surgical history on file.          Social History:     Social History     Tobacco Use     Smoking status: Never Smoker     Smokeless tobacco: Never Used   Substance Use Topics     Alcohol use: Not on file          Family History:   No family history on file.        Allergies:      Allergies   Allergen Reactions     Ciprofloxacin Rash     Latex      Liquid Adhesive Rash     Sulfa Drugs      Sulfamethoxazole-Trimethoprim Hives and Rash          Medications:     Current Outpatient Medications   Medication Sig     acetaminophen (TYLENOL) 500 MG tablet Take 500-1,000 mg by mouth     ammonium lactate (LAC-HYDRIN) 12 % external lotion      fexofenadine (ALLEGRA) 60 MG tablet Take 60 mg by mouth     fluticasone (FLONASE) 50 MCG/ACT nasal spray Spray 1-2 sprays in nostril     gabapentin (NEURONTIN) 100 MG capsule Take 100 mg by mouth     levothyroxine  (SYNTHROID/LEVOTHROID) 75 MCG tablet Take 75 mcg by mouth     lisinopril (PRINIVIL/ZESTRIL) 20 MG tablet Take 20 mg by mouth     modafinil (PROVIGIL) 200 MG tablet Take 200 mg by mouth     pravastatin (PRAVACHOL) 40 MG tablet Take 40 mg by mouth     tretinoin (RETIN-A) 0.05 % external cream      No current facility-administered medications for this visit.             Physical Exam:      A comprehensive physical examination is deferred due to PHE (public health emergency) telephone visit restrictions.         Current Laboratory Data:   All laboratory and imaging data reviewed.    No results found for this or any previous visit (from the past 24 hour(s)).         Previous Cardiology Imaging   No results found for this or any previous visit (from the past 8760 hour(s)).

## 2020-08-06 ENCOUNTER — HOSPITAL ENCOUNTER (EMERGENCY)
Facility: CLINIC | Age: 55
Discharge: HOME | End: 2020-08-06
Attending: EMERGENCY MEDICINE | Admitting: EMERGENCY MEDICINE
Payer: COMMERCIAL

## 2020-08-06 VITALS
TEMPERATURE: 98.1 F | SYSTOLIC BLOOD PRESSURE: 132 MMHG | RESPIRATION RATE: 16 BRPM | OXYGEN SATURATION: 96 % | DIASTOLIC BLOOD PRESSURE: 78 MMHG

## 2020-08-06 DIAGNOSIS — M54.50 ACUTE LEFT-SIDED LOW BACK PAIN WITHOUT SCIATICA: ICD-10-CM

## 2020-08-06 PROCEDURE — 99283 EMERGENCY DEPT VISIT LOW MDM: CPT

## 2020-08-06 PROCEDURE — 25000132 ZZH RX MED GY IP 250 OP 250 PS 637: Performed by: EMERGENCY MEDICINE

## 2020-08-06 RX ORDER — ACETAMINOPHEN 500 MG
500-1000 TABLET ORAL EVERY 8 HOURS PRN
Qty: 1 TABLET | Refills: 0 | Status: SHIPPED | OUTPATIENT
Start: 2020-08-06 | End: 2020-08-16

## 2020-08-06 RX ORDER — ACETAMINOPHEN 500 MG
1000 TABLET ORAL EVERY 4 HOURS PRN
Status: DISCONTINUED | OUTPATIENT
Start: 2020-08-06 | End: 2020-08-07 | Stop reason: HOSPADM

## 2020-08-06 RX ADMIN — ACETAMINOPHEN 1000 MG: 500 TABLET, FILM COATED ORAL at 21:04

## 2020-08-06 ASSESSMENT — ENCOUNTER SYMPTOMS
VOMITING: 0
FEVER: 0
CHILLS: 0
NUMBNESS: 0

## 2020-08-06 NOTE — ED AVS SNAPSHOT
[facility] Emergency Department  [address]  [facility] [address]  Phone:  [phone]  Fax:  [phone]                                    Connie Valdes   MRN: [id number]    Department:  [facility] Emergency Department   Date of Visit:  8/6/2020           After Visit Summary Signature Page    I have received my discharge instructions, and my questions have been answered. I have discussed any challenges I see with this plan with the nurse or doctor.    ..........................................................................................................................................  Patient/Patient Representative Signature      ..........................................................................................................................................  Patient Representative Print Name and Relationship to Patient    ..................................................               ................................................  Date                                   Time    ..........................................................................................................................................  Reviewed by Signature/Title    ...................................................              ..............................................  Date                                               Time          22EPIC Rev 08/18

## 2020-08-07 NOTE — ED TRIAGE NOTES
Pt arrives to ED started a new job today but couldn't finish shift has hx sciatica mostly left sided pain. Pt takes tylenol none today. Pain is in lower back reports neck pain no trauma. Ambulating independently. A/ox 4, ABCs intact.

## 2020-08-07 NOTE — ED PROVIDER NOTES
History     Chief Complaint:  Back Pain      HPI   Connie Valdes is a 55 year old female with a history of hypertension, diabetes type 2, breast cancer, and chronic back pain who presents to the emergency department for evaluation of back pain. 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 Her next shift is tomorrow and she is hoping for symptom control so she can work through the entire shift. She was referred to a chiropractor in the past but never went, and has never been to physical therapy. She has neuropathy from past chemotherapy treatment but otherwise no numbness. No fevers, chills, vomiting, or radicular symptoms.     Allergies:  Ciprofloxacin   Sulfa drugs     Medications:    Lac-Hydrin  Allegra  Flonase  Neurontin   Levothyroxine  Lisinopril   Provigil  Pravachol      Past Medical History:    Left leg cellulitis   Hypothyroidism  Left breast cancer  Obstructive sleep apnea   Hypertension   Diabetes type 2     Past Surgical History:    Left breast lumpectomy   Vein ablation     Family History:    Chronic obstructive pulmonary disease   Lung cancer (smoker)    Social History:  Tobacco Use: Never  PCP: [place]  Marital Status:  Single      Review of Systems   Constitutional: Negative for chills and fever.   Gastrointestinal: Negative for vomiting.   Neurological: Negative for numbness.   All other systems reviewed and are negative.      Physical Exam     Patient Vitals for the past 24 hrs:   BP Temp Temp src Heart Rate Resp SpO2   08/06/20 2059 132/78 98.1  F (36.7  C) Oral 93 16 96 %     Physical Exam    Nursing note and vitals reviewed.    Constitutional: Pleasant         HENT:    Mouth/Throat:  Oral mucosa moist.    Eyes: Conjunctivae are normal. No scleral icterus.    Neck: Neck supple.  bilateral trapezius muscle tightness.   Cardiovascular: Normal rate, regular rhythm and intact distal pulses.    Pulmonary/Chest: Effort normal and breath sounds normal.   Abdominal: Soft.  No distension. There is no tenderness.   Musculoskeletal:  No midline lumbar tenderness. No overlying skin changes. Negative straight leg raise.   Neurological:Alert. Coordination normal. Upper and lower extremity strength intact throughout. Light touch sensation intact throughout.   Skin: Skin is warm and dry.   Psychiatric: Normal mood and affect.     Emergency Department Course   Interventions:  2104 Tylenol 1000 mg tablet PO    Emergency Department Course:  2135 Nursing notes and vitals reviewed. I performed an exam of the patient as documented above.     Medicine administered as documented above.     Findings and plan explained to the Patient. Patient discharged home with instructions regarding supportive care, medications, and reasons to return. The importance of close follow-up was reviewed. The patient was prescribed Tylenol.     Impression & Plan    Medical Decision Making:  Connie Valdes is a 55 year old female presented with back pain.  Pain has improved with interventions in the emergency department.  The patient did not sustain any trauma and does not have significant midline tenderness and therefore I feel that X-rays are not indicated due to the low likelihood of fracture or subluxation however the patient understands that if the pain persists x rays will be indicated. The patient has not had a fever, saddle/perineal anesthesia, focal weakness, or bowel or bladder dysfunction. There is no clinical evidence of cauda equina syndrome, discitis, spinal/epidural space hematoma or epidural abscess. The neurological exam is normal and the patient's symptoms are consistent with a musculoskeletal back pain.  The patient will be discharged with pain medications to use as directed.  Ice or heat to the back and stretching  exercises.  No heavy lifting, bending or twisting. she have been instructed to return to the ED with weakness, fever, or bowel or bladder dysfunction.  The patient was advised to schedule follow-up with his/her primary doctor within 3 days for ongoing evaluation and management and PT referral.     Diagnosis:    ICD-10-CM    1. Acute left-sided low back pain without sciatica  M54.5        Disposition:  Discharged to home    Discharge Medications:  New Prescriptions    ACETAMINOPHEN (TYLENOL) 500 MG TABLET    Take 1-2 tablets (500-1,000 mg) by mouth every 8 hours as needed for mild pain (DO NOT FILL! For dosing only.) DO NOT FILL!   For Dosing Only     Scribe Disclosure:  I, Fredo Billingsley, am serving as a scribe on 8/6/2020 at 9:35 PM to personally document services performed by Nubia Chavez based on my observations and the provider's statements to me.     Fredo Billingsley  8/6/2020   [facility] EMERGENCY DEPARTMENT       Nubia Cardozo MD  08/06/20 3990

## 2021-01-09 ENCOUNTER — HEALTH MAINTENANCE LETTER (OUTPATIENT)
Age: 56
End: 2021-01-09

## 2021-10-23 ENCOUNTER — HEALTH MAINTENANCE LETTER (OUTPATIENT)
Age: 56
End: 2021-10-23

## 2022-02-12 ENCOUNTER — HEALTH MAINTENANCE LETTER (OUTPATIENT)
Age: 57
End: 2022-02-12

## 2022-10-09 ENCOUNTER — HEALTH MAINTENANCE LETTER (OUTPATIENT)
Age: 57
End: 2022-10-09

## 2023-02-12 ENCOUNTER — HEALTH MAINTENANCE LETTER (OUTPATIENT)
Age: 58
End: 2023-02-12

## 2023-03-25 ENCOUNTER — HEALTH MAINTENANCE LETTER (OUTPATIENT)
Age: 58
End: 2023-03-25